# Patient Record
Sex: FEMALE | Race: WHITE | NOT HISPANIC OR LATINO | Employment: STUDENT | URBAN - METROPOLITAN AREA
[De-identification: names, ages, dates, MRNs, and addresses within clinical notes are randomized per-mention and may not be internally consistent; named-entity substitution may affect disease eponyms.]

---
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
## 2024-07-24 RX ORDER — SPIRONOLACTONE 50 MG/1
TABLET, FILM COATED ORAL
Refills: 0 | Status: CANCELLED | OUTPATIENT
Start: 2024-07-24

## 2024-07-24 NOTE — TELEPHONE ENCOUNTER
Patient had an appointment for her annual today and canceled it on mychart by mistake. Patient is leaving for college on 8/6 and we don't have any appointments available before she leaves. Made an appointment for when she's back in town from college in November. Patient is asking for refills of her birth control until she's able to be seen in November. Patient uses the pharmacy on file.  Pharmacy/Patient is requesting refill for:    Last Annual:5/17/2022    Next Annual: 11/25/2024      Current Outpatient Medications:     albuterol HFA (VENTOLIN HFA) 90 mcg/actuation inhaler, USE TWO PUFF(S) BY INHALED ROUTE EVERY 4 HOURS AS NEEDED FOR WHEEZING OR COUGH., Disp: , Rfl:     EPINEPHrine (EPIPEN) 0.3 mg/0.3 mL injection syringe, Inject 0.3 mg into the thigh., Disp: , Rfl:     fluticasone HFA (FLOVENT HFA) 110 mcg/actuation inhaler, Inhale TWO puff(s) by mouth 2 times daily. Use with spacer., Disp: , Rfl:     norgestimate-ethinyl estradioL (DEBBIE) 0.25-35 mg-mcg per tablet, Take 1 tablet by mouth daily., Disp: , Rfl:     spironolactone (ALDACTONE) 50 mg tablet, TAKE 2 TABLETS BY MOUTH EVERY DAY WITH A FULL GLASS OF WATER, Disp: , Rfl:

## 2024-07-26 RX ORDER — NORGESTIMATE AND ETHINYL ESTRADIOL 0.25-0.035
1 KIT ORAL DAILY
Qty: 84 TABLET | Refills: 3 | Status: SHIPPED | OUTPATIENT
Start: 2024-07-26 | End: 2024-11-26 | Stop reason: SDUPTHER

## 2024-11-26 ENCOUNTER — OFFICE VISIT (OUTPATIENT)
Dept: OBSTETRICS AND GYNECOLOGY | Facility: CLINIC | Age: 19
End: 2024-11-26
Payer: COMMERCIAL

## 2024-11-26 VITALS
SYSTOLIC BLOOD PRESSURE: 122 MMHG | HEIGHT: 72 IN | BODY MASS INDEX: 30.61 KG/M2 | WEIGHT: 226 LBS | DIASTOLIC BLOOD PRESSURE: 60 MMHG

## 2024-11-26 DIAGNOSIS — Z01.419 ENCOUNTER FOR GYNECOLOGICAL EXAMINATION WITHOUT ABNORMAL FINDING: ICD-10-CM

## 2024-11-26 DIAGNOSIS — Z30.41 ENCOUNTER FOR SURVEILLANCE OF CONTRACEPTIVE PILLS: Primary | ICD-10-CM

## 2024-11-26 DIAGNOSIS — F41.9 ANXIETY AND DEPRESSION: ICD-10-CM

## 2024-11-26 DIAGNOSIS — F32.A ANXIETY AND DEPRESSION: ICD-10-CM

## 2024-11-26 DIAGNOSIS — Z11.3 ROUTINE SCREENING FOR STI (SEXUALLY TRANSMITTED INFECTION): ICD-10-CM

## 2024-11-26 PROCEDURE — 3008F BODY MASS INDEX DOCD: CPT

## 2024-11-26 PROCEDURE — 99395 PREV VISIT EST AGE 18-39: CPT

## 2024-11-26 RX ORDER — CETIRIZINE HYDROCHLORIDE 10 MG/1
10 TABLET ORAL DAILY
COMMUNITY

## 2024-11-26 RX ORDER — NORGESTIMATE AND ETHINYL ESTRADIOL 0.25-0.035
1 KIT ORAL DAILY
Qty: 84 TABLET | Refills: 3 | Status: SHIPPED | OUTPATIENT
Start: 2024-11-26

## 2024-11-26 ASSESSMENT — ENCOUNTER SYMPTOMS
SHORTNESS OF BREATH: 0
ROS SKIN COMMENTS: ACNE
NAUSEA: 0
ARTHRALGIAS: 0
CHEST TIGHTNESS: 0
TROUBLE SWALLOWING: 0
WEAKNESS: 0
COLOR CHANGE: 0
SEIZURES: 0
UNEXPECTED WEIGHT CHANGE: 0
VOMITING: 0
VOICE CHANGE: 0
ABDOMINAL PAIN: 0
MYALGIAS: 0
NERVOUS/ANXIOUS: 1
DIFFICULTY URINATING: 0
LIGHT-HEADEDNESS: 0
CONSTIPATION: 0
DIZZINESS: 0
ACTIVITY CHANGE: 0
BRUISES/BLEEDS EASILY: 0
HEADACHES: 0
COUGH: 0
PHOTOPHOBIA: 0
HEMATOLOGIC/LYMPHATIC NEGATIVE: 1
DIARRHEA: 0
PALPITATIONS: 0
APPETITE CHANGE: 0

## 2024-11-26 NOTE — PROGRESS NOTES
2024    Mikki Saucedo is a 19 y.o. female who presents for annual exam.      Periods are regular every 28-30 days, lasting 4-5 days. Has cramping and fatigue usually on the first day of the cycle.     The patient is sexually active. Partner - James. Feels safe with him. No history of assault.     Current contraception: condoms and OCP (estrogen/progesterone)  History of abnormal Pap smear: too young  Family history of uterine or ovarian cancer: no  Regular self breast exam: yes  History of abnormal mammogram: has had normal mammogram and breast ultrasound - found dense tissue  Family history of breast cancer: no    Menstrual History:  OB History          0    Para   0    Term   0       0    AB   0    Living   0         SAB   0    IAB   0    Ectopic   0    Multiple   0    Live Births   0                Patient's last menstrual period was 2024 (exact date).  Menopause Status: Pre-Menopause  Period Cycle (Days): 28  Period Duration (Days): 5  Period Pattern: Regular  Menstrual Flow: Moderate  Menstrual Control: Tampon, Maxi pad  Dysmenorrhea: (!) Mild  Dysmenorrhea Symptoms: Cramping (fatigue)      Review of Systems and Physical Exam  The following have been reviewed and updated as appropriate in this visit:  Tobacco  Allergies  Meds  Problems  Med Hx  Surg Hx  Fam Hx  Soc   Hx      Current Outpatient Medications   Medication Sig Dispense Refill    albuterol HFA (VENTOLIN HFA) 90 mcg/actuation inhaler USE TWO PUFF(S) BY INHALED ROUTE EVERY 4 HOURS AS NEEDED FOR WHEEZING OR COUGH.      cetirizine (ZyrTEC) 10 mg tablet Take 10 mg by mouth daily.      EPINEPHrine (EPIPEN) 0.3 mg/0.3 mL injection syringe Inject 0.3 mg into the thigh.      norgestimate-ethinyl estradioL (DEBBIE) 0.25-35 mg-mcg per tablet Take 1 tablet by mouth daily. 84 tablet 3    fluticasone HFA (FLOVENT HFA) 110 mcg/actuation inhaler Inhale TWO puff(s) by mouth 2 times daily. Use with spacer.       No current  facility-administered medications for this visit.     Allergies   Allergen Reactions    Gluten Anaphylaxis    Peanut Anaphylaxis     Other reaction(s): Edema-Face/Lips      Problem List Items Addressed This Visit          Mental Health    Anxiety and depression    Relevant Medications    cetirizine (ZyrTEC) 10 mg tablet     Other Visit Diagnoses       Encounter for surveillance of contraceptive pills    -  Primary    Encounter for gynecological examination without abnormal finding        Relevant Orders    CT, NG, TRICH VAGINALIS    Routine screening for STI (sexually transmitted infection)        Relevant Orders    CT, NG, TRICH VAGINALIS             Visit Vitals  /60 (BP Location: Left upper arm, Patient Position: Sitting)   Ht 1.829 m (6')   Wt 103 kg (226 lb)   LMP 11/06/2024 (Exact Date)   BMI 30.65 kg/m²     HPI  Review of Systems   Constitutional:  Negative for activity change, appetite change and unexpected weight change.   HENT:  Negative for congestion, hearing loss, mouth sores, trouble swallowing and voice change.    Eyes:  Negative for photophobia and visual disturbance.   Respiratory:  Negative for cough, chest tightness and shortness of breath.    Cardiovascular:  Negative for chest pain and palpitations.   Gastrointestinal:  Negative for abdominal pain, constipation, diarrhea, nausea and vomiting.   Endocrine: Negative for cold intolerance and heat intolerance.   Genitourinary:  Negative for difficulty urinating, dyspareunia, menstrual problem, pelvic pain, vaginal bleeding, vaginal discharge and vaginal pain.   Musculoskeletal:  Negative for arthralgias and myalgias.   Skin:  Negative for color change and rash.        acne   Allergic/Immunologic: Negative for environmental allergies, food allergies and immunocompromised state.   Neurological:  Negative for dizziness, seizures, weakness, light-headedness and headaches.   Hematological: Negative.  Does not bruise/bleed easily.    Psychiatric/Behavioral:  Negative for suicidal ideas. The patient is nervous/anxious.      Physical Exam  Constitutional:       Appearance: Normal appearance.   Genitourinary:      Vulva, bladder and urethral meatus normal.      No lesions in the vagina.      Right Labia: No rash, tenderness, lesions or skin changes.     Left Labia: No tenderness, lesions, skin changes or rash.     No labial fusion noted.      No inguinal adenopathy present in the right or left side.     No vaginal discharge, tenderness or bleeding.        Right Adnexa: not tender and not palpable.     Left Adnexa: not tender and not palpable.     Cervix is nulliparous.      Cervix is not parous or absent.      No cervical motion tenderness, discharge, friability, lesion or eversion.      Uterus is not enlarged, fixed, tender or irregular.      Pelvic exam was performed with patient in the lithotomy position.   Breasts:     Breasts are symmetrical.      Right: No inverted nipple, mass, nipple discharge, skin change or tenderness.      Left: No inverted nipple, mass, nipple discharge, skin change or tenderness.   HENT:      Head: Normocephalic.      Right Ear: External ear normal.      Left Ear: External ear normal.      Mouth/Throat:      Mouth: Mucous membranes are moist.   Eyes:      Extraocular Movements: Extraocular movements intact.   Cardiovascular:      Rate and Rhythm: Normal rate and regular rhythm.      Heart sounds: Normal heart sounds.   Pulmonary:      Effort: Pulmonary effort is normal.   Abdominal:      Palpations: Abdomen is soft.   Musculoskeletal:         General: Normal range of motion.      Cervical back: Normal range of motion.   Lymphadenopathy:      Lower Body: No right inguinal adenopathy. No left inguinal adenopathy.   Neurological:      Mental Status: She is alert and oriented to person, place, and time. Mental status is at baseline.   Skin:     General: Skin is warm.   Psychiatric:         Mood and Affect: Mood normal.          Behavior: Behavior normal.         Thought Content: Thought content normal.         Judgment: Judgment normal.   Vitals and nursing note reviewed.        Diagnoses and all orders for this visit:    Encounter for gynecological examination without abnormal finding  Pelvic and breast exam performed with patient's permission  SBE and BC warning signs reviewed  PAP guidelines reviewed - discussed starting next year    Encounter for surveillance of contraceptive pills  Reviewed use - discussed taking on time, not skipping pills   Recommend condoms with OCP for STI prevention  Reviewed medical history - no contraindications  Refill sent to pharmacy on file - norgestimate-ethinyl estradioL (DEBBIE) 0.25-35 mg-mcg per tablet; Take 1 tablet by mouth daily    Routine screening for STI (sexually transmitted infection)  Swab collected - CT, NG, TRICH VAGINALIS  Encouraged condoms for STI prevention    Return in about 1 year (around 11/26/2025), or if symptoms worsen or fail to improve.    Soledad Daniels, CAROLYN

## 2024-11-29 LAB
C TRACH RRNA SPEC QL NAA+PROBE: NEGATIVE
N GONORRHOEA RRNA SPEC QL NAA+PROBE: NEGATIVE
T VAGINALIS RRNA SPEC QL NAA+PROBE: NEGATIVE